# Patient Record
Sex: FEMALE | Race: WHITE | URBAN - METROPOLITAN AREA
[De-identification: names, ages, dates, MRNs, and addresses within clinical notes are randomized per-mention and may not be internally consistent; named-entity substitution may affect disease eponyms.]

---

## 2017-05-31 ENCOUNTER — OFFICE VISIT (OUTPATIENT)
Dept: DERMATOLOGY | Facility: AMBULATORY SURGERY CENTER | Age: 67
End: 2017-05-31

## 2017-05-31 ENCOUNTER — HOSPITAL ENCOUNTER (OUTPATIENT)
Dept: LAB | Age: 67
Discharge: HOME | End: 2017-05-31

## 2017-05-31 VITALS
WEIGHT: 115 LBS | DIASTOLIC BLOOD PRESSURE: 66 MMHG | SYSTOLIC BLOOD PRESSURE: 122 MMHG | RESPIRATION RATE: 15 BRPM | TEMPERATURE: 98.6 F | HEIGHT: 62 IN | OXYGEN SATURATION: 99 % | BODY MASS INDEX: 21.16 KG/M2 | HEART RATE: 68 BPM

## 2017-05-31 DIAGNOSIS — D22.9 MULTIPLE BENIGN NEVI: ICD-10-CM

## 2017-05-31 DIAGNOSIS — L57.8 SUN-DAMAGED SKIN: ICD-10-CM

## 2017-05-31 DIAGNOSIS — L57.0 ACTINIC KERATOSIS: ICD-10-CM

## 2017-05-31 DIAGNOSIS — D18.01 CHERRY ANGIOMA: ICD-10-CM

## 2017-05-31 DIAGNOSIS — D48.5 NEOPLASM OF UNCERTAIN BEHAVIOR OF SKIN OF FOREHEAD: Primary | ICD-10-CM

## 2017-05-31 DIAGNOSIS — L82.1 SEBORRHEIC KERATOSES: ICD-10-CM

## 2017-05-31 RX ORDER — TRETINOIN 0.25 MG/G
CREAM TOPICAL
Qty: 20 G | Refills: 1 | Status: SHIPPED | OUTPATIENT
Start: 2017-05-31

## 2017-05-31 NOTE — PROGRESS NOTES
Name: Patito Caceres       Age: 78 y.o. Date: 5/31/2017    Chief Complaint:   Chief Complaint   Patient presents with    Skin Exam     Area of concern mid chest has resolved some       Subjective:    HPI  Ms. Patito Caceres is a 78 y.o. female who presents for a full skin exam.  The patient's last skin exam was on 2/2016 and the patient does have current complaints related to her skin. Just over a month ago she noticed a scaly lesion on her chest.  She states this has since improved some but reports that it is still a bit scaly in this area. She does have sun exposure including a recent cruise. She does report that she uses sunscreen regularly. She also reports that there is a brown lesion on her right temple that she would like assistance in managing the color of. She has tried over-the-counter creams without much improvement. Ms. Patito Caceres is feeling well and in her usual state of health today. The patient's pertinent skin history includes : No personal history of skin cancer, has had significant sun exposure however. ROS: Constitutional: Negative. Dermatological : positive for - skin lesion changes      Social History     Social History    Marital status: UNKNOWN     Spouse name: N/A    Number of children: N/A    Years of education: N/A     Occupational History    Not on file. Social History Main Topics    Smoking status: Never Smoker    Smokeless tobacco: Never Used    Alcohol use 0.0 - 0.6 oz/week     0 - 1 Standard drinks or equivalent per week    Drug use: No    Sexual activity: Not on file     Other Topics Concern    Not on file     Social History Narrative       Family History   Problem Relation Age of Onset    No Known Problems Mother     No Known Problems Father        Past Medical History:   Diagnosis Date    Sleep apnea     Sun-damaged skin     Tanning bed exposure        History reviewed. No pertinent surgical history.     Current Outpatient Prescriptions Medication Sig Dispense Refill    tretinoin (RETIN-A) 0.025 % topical cream Apply  to affected area nightly. 20 g 1    PARoxetine CR (PAXIL-CR) 37.5 mg tablet Take 37.5 mg by mouth daily.  estrogen, conjugated,-medroxyPROGESTERone (PREMPRO) 0.625-5 mg per tablet Take 1 Tab by mouth daily.  CALCIUM CARBONATE (CALCIUM 300 PO) Take  by mouth.  cholecalciferol (VITAMIN D3) 1,000 unit tablet Take  by mouth daily. Allergies   Allergen Reactions    Ceftin [Cefuroxime Axetil] Hives    Codeine Hives         Objective:    Visit Vitals    /66    Pulse 68    Temp 98.6 °F (37 °C) (Oral)    Resp 15    Ht 5' 2\" (1.575 m)    Wt 52.2 kg (115 lb)    SpO2 99%    BMI 21.03 kg/m2       Katie Francis is a 78 y.o. female who appears well and in no distress. She is awake, alert, and oriented. There is no preauricular, submandibular, or cervical lymphadenopathy. A skin examination was performed including her scalp, face (including eyelids), ears, neck, chest, back, abdomen, upper extremities (including digits/nails), lower extremities, breasts, buttocks; genital skin was not examined. She is heavily freckled with sun damaged skin in sun exposed areas. There is a 3 x 3 mm pearly papule on the left upper forehead that is concerning for new basal cell carcinoma. There is a larger tan and brown macule on the right temple and sideburn area. This is most consistent with seborrheic keratosis. She has other scattered waxy macules and keratotic papules consistent with a noninflamed seborrheic keratoses as well. There are scattered cherry angiomas. She has few nevi, those of the examined areas are medium brown junctional with a few intradermal, no concerning features. On the chest, lesion of concern is a actinic keratosis, thin scaled. Assessment/Plan:  1. Neoplasm of Uncertain Behavior, left forehead, r/o BCC. The differential diagnoses were discussed.  A shave biopsy was advised to sample this lesion. The procedure was reviewed and verbal and written consent were obtained. The risks of pain, bleeding, infection, and scar were discussed. The patient is aware that this is a sample and is intended for diagnosis and not therapy of the skin lesion. I performed the procedure. The site was cleansed and anesthetized with 1% Lidocaine with Epinephrine 1:100,000. A shave biopsy was performed to sample the lesion. Drysol was used for hemostasis. The wound was bandaged and care reviewed. The specimen was sent to pathology. I will contact the patient with the results and any further treatment that may be necessary. 2. Actinic Keratoses. The diagnosis of this precancerous lesion related to sun exposure was reviewed. Verbal consent was obtained. I treated 1 lesions with cryotherapy and post-cryotherapy care was reviewed. 3.Seborrheic keratoses. The diagnosis was reviewed and the patient was reassured that no treatment is needed for these benign lesions. A trial of cryotherapy to assess response was done on the right sideburn area. 4. Normal nevi. The diagnosis of normal nevi was reviewed. I discussed sun protection, sunscreen use, the warning signs of skin cancer, the need for self-skin examinations, and the need for regular practitioner exams every 1 year. The patient should follow up sooner as needed if new, changing, or symptomatic skin lesions arise. 5. Solar lentigos. The diagnosis and relationship to sun exposure was reviewed. Sun protection advised. 6. Cherry angiomas. The diagnosis was reviewed and the patient was reassured that no treatment is needed for these benign lesions. Retin A prescribed for actinic damage and evening pigmentation. She will use [facility]. [facility]   OFFICE PROCEDURE PROGRESS NOTE   Chart reviewed for the following:   I, John Alberts, have reviewed the History, Physical and updated the Allergic reactions for [employer]. TIME OUT performed immediately prior to start of procedure:   Melissa WINTERS, have performed the following reviews on [employer]   prior to the start of the procedure:     * Patient was identified by name and date of birth   * Agreement on procedure being performed was verified   * Risks and Benefits explained to the patient   * Procedure site verified and marked as necessary   * Patient was positioned for comfort   * Consent was signed and verified     Time: 8518  Date of procedure: 5/31/2017  Procedure performed by: Kristie Claros.  Kavya Stewart  Provider assisted by: lpn   Patient assisted by: self   How tolerated by patient: tolerated the procedure well with no complications   Comments: none

## 2017-05-31 NOTE — MR AVS SNAPSHOT
Visit Information Date & Time Provider Department Dept. Phone Encounter #  
 5/31/2017 11:30 AM IRINEO Wallis57 [phone] [id number] Your Appointments 5/31/2017 11:30 AM  
Office Visit with IRINEO Wallis [address] [facility]) Appt Note: EST PT: Skin Exam; EST PT: Skin Exam  
 [facility] [address] [address]  
[address] [place] [address] [address] [facility] [address] [address] Upcoming Health Maintenance Date Due Hepatitis C Screening 1950 DTaP/Tdap/Td series (1 - Tdap) 12/20/1970 BREAST CANCER SCRN MAMMOGRAM 12/20/1999 FOBT Q 1 YEAR AGE 50-75 12/20/1999 ZOSTER VACCINE AGE 60> 12/20/2009 GLAUCOMA SCREENING Q2Y 12/20/2014 OSTEOPOROSIS SCREENING (DEXA) 12/20/2014 Pneumococcal 65+ Low/Medium Risk (1 of 2 - PCV13) 12/20/2014 MEDICARE YEARLY EXAM 12/20/2014 INFLUENZA AGE 9 TO ADULT 8/1/2017 Allergies as of 5/31/2017  Review Complete On: 5/31/2017 By: Yuniel Humphreys LPN Severity Noted Reaction Type Reactions Ceftin [Cefuroxime Axetil] Medium 02/26/2016   Systemic Hives Codeine Medium 02/26/2016   Systemic Hives Current Immunizations  Never Reviewed No immunizations on file. Not reviewed this visit Vitals BP Pulse Temp Resp Height(growth percentile) Weight(growth percentile) 122/66 68 98.6 °F (37 °C) (Oral) 15 5' 2\" (1.575 m) 115 lb (52.2 kg) SpO2 BMI OB Status Smoking Status 99% 21.03 kg/m2 Postmenopausal Never Smoker BMI and BSA Data Body Mass Index Body Surface Area 21.03 kg/m 2 1.51 m 2 Your Updated Medication List  
  
   
This list is accurate as of: 5/31/17 11:22 AM.  Always use your most recent med list.  
  
  
  
  
 CALCIUM 300 PO Take  by mouth. cholecalciferol 1,000 unit tablet Commonly known as:  VITAMIN D3  
 Take  by mouth daily. estrogen (conjugated)-medroxyPROGESTERone 0.625-5 mg per tablet Commonly known as:  Esther Ji Take 1 Tab by mouth daily. PARoxetine CR 37.5 mg tablet Commonly known as:  PAXIL-CR Take 37.5 mg by mouth daily. Patient Instructions Self Skin Exam and Sunscreens Early detection and treatment is essential in the treatment of all forms of skin cancer. If caught early, all forms of skin cancer are curable. In addition to your regular visits, you should perform a monthly skin examination. Over time, you become familiar with what is normally found on your skin and can identify new or suspicious spots. One of the screening tools you can use to assess your skin is to follow the ABCDEs: 
 
A= Asymmetry (One half is unlike the other half) B= Border (An irregular, scalloped or poorly defined edge) C= Color (Is varied from one area to another, has shades of tan, brown/ black,       white, red or blue) D= Diameter (Spots larger than 6mm or a pencil eraser) E= Evolving (New spots or one that is changing in size, shape, or color) A follow- up interval will be customized based on your history of skin cancer or level of skin damage and risk factors. In any case, if you notice a suspicious or new spot, an appointment should be arranged between regular visits. Everyone should use sunscreen and sun-safe practices, which is especially important for those with a personal or family history of skin cancer. Suggestions for this include: 1. Use daily moisturizers containing SPF 30 or higher. 2. Wear long sleeve clothing with UPF ratings and a broad-brimmed hat. 3. Apply sunscreen with SPF 30 or higher to all sun exposed areas if you are going to be in the sun. A broad spectrum UVA/ UVB sunscreen is best.  Dont forget to REAPPLY every two hours or more often if swimming or sweating! 4.  Avoid outside activities during peak sun hours, especially in the summer (10am- 2pm). 5. DO NOT use tanning beds. Using sunscreen and sun-safe practices can help reduce the likelihood of developing skin cancer or additional skin cancers in those previously diagnosed. Introducing [facility]! Xiomara Pedersen introduces [employer] patient portal. Now you can access parts of your medical record, email your doctor's office, and request medication refills online. 1. In your internet browser, go to [email or web address] 2. Click on the First Time User? Click Here link in the Sign In box. You will see the New Member Sign Up page. 3. Enter your [employer] Access Code exactly as it appears below. You will not need to use this code after youve completed the sign-up process. If you do not sign up before the expiration date, you must request a new code. · [employer] Access Code: [email or web address] Expires: 8/29/2017 11:22 AM 
 
4. Enter the last four digits of your Social Security Number (xxxx) and Date of Birth (mm/dd/yyyy) as indicated and click Submit. You will be taken to the next sign-up page. 5. Create a [employer] ID. This will be your [employer] login ID and cannot be changed, so think of one that is secure and easy to remember. 6. Create a [employer] password. You can change your password at any time. 7. Enter your Password Reset Question and Answer. This can be used at a later time if you forget your password. 8. Enter your e-mail address. You will receive e-mail notification when new information is available in [address] 9. Click Sign Up. You can now view and download portions of your medical record. 10. Click the Download Summary menu link to download a portable copy of your medical information. If you have questions, please visit the Frequently Asked Questions section of the [employer] website. Remember, [employer] is NOT to be used for urgent needs. For medical emergencies, dial 911. Now available from your iPhone and Android! Please provide this summary of care documentation to your next provider. If you have any questions after today's visit, please call [phone].

## 2017-06-06 NOTE — PROGRESS NOTES
I spoke with her  and he states she is doing well from the biopsy. Benign mole reviewed. No further tx needed.  Follow up 1 year